# Patient Record
Sex: MALE | Race: WHITE | Employment: FULL TIME
[De-identification: names, ages, dates, MRNs, and addresses within clinical notes are randomized per-mention and may not be internally consistent; named-entity substitution may affect disease eponyms.]

---

## 2019-01-22 ENCOUNTER — NURSE TRIAGE (OUTPATIENT)
Dept: OTHER | Facility: CLINIC | Age: 29
End: 2019-01-22

## 2019-01-22 ENCOUNTER — HOSPITAL ENCOUNTER (EMERGENCY)
Dept: GENERAL RADIOLOGY | Age: 29
Discharge: HOME | End: 2019-01-22
Payer: COMMERCIAL

## 2019-01-22 ENCOUNTER — HOSPITAL ENCOUNTER (EMERGENCY)
Age: 29
Discharge: HOME | End: 2019-01-22
Payer: COMMERCIAL

## 2019-01-22 VITALS
DIASTOLIC BLOOD PRESSURE: 76 MMHG | HEART RATE: 76 BPM | TEMPERATURE: 98 F | HEIGHT: 70 IN | BODY MASS INDEX: 31.5 KG/M2 | OXYGEN SATURATION: 98 % | SYSTOLIC BLOOD PRESSURE: 123 MMHG | WEIGHT: 220 LBS | RESPIRATION RATE: 16 BRPM

## 2019-01-22 DIAGNOSIS — S93.401A SPRAIN OF RIGHT ANKLE, UNSPECIFIED LIGAMENT, INITIAL ENCOUNTER: Primary | ICD-10-CM

## 2019-01-22 PROCEDURE — 99202 OFFICE O/P NEW SF 15 MIN: CPT | Performed by: NURSE PRACTITIONER

## 2019-01-22 PROCEDURE — 99203 OFFICE O/P NEW LOW 30 MIN: CPT

## 2019-01-22 PROCEDURE — 73610 X-RAY EXAM OF ANKLE: CPT

## 2019-01-22 ASSESSMENT — PAIN DESCRIPTION - ORIENTATION: ORIENTATION: LEFT

## 2019-01-22 ASSESSMENT — ENCOUNTER SYMPTOMS
CHEST TIGHTNESS: 0
SHORTNESS OF BREATH: 0
COLOR CHANGE: 0

## 2019-01-22 ASSESSMENT — PAIN DESCRIPTION - LOCATION: LOCATION: ANKLE

## 2019-01-22 ASSESSMENT — PAIN DESCRIPTION - FREQUENCY: FREQUENCY: CONTINUOUS

## 2019-01-22 ASSESSMENT — PAIN - FUNCTIONAL ASSESSMENT: PAIN_FUNCTIONAL_ASSESSMENT: PREVENTS OR INTERFERES SOME ACTIVE ACTIVITIES AND ADLS

## 2019-01-22 ASSESSMENT — PAIN SCALES - GENERAL: PAINLEVEL_OUTOF10: 6

## 2019-01-22 ASSESSMENT — PAIN DESCRIPTION - PAIN TYPE: TYPE: ACUTE PAIN

## 2022-08-30 LAB
CHOLESTEROL, TOTAL: 177 MG/DL (ref 0–199)
FASTING: YES
GLUCOSE BLD-MCNC: 109 MG/DL (ref 74–109)
HDLC SERPL-MCNC: 38 MG/DL (ref 40–90)
LDL CHOLESTEROL CALCULATED: 119 MG/DL
TRIGL SERPL-MCNC: 102 MG/DL (ref 0–199)

## 2023-07-30 LAB — FASTING: YES

## 2023-07-31 LAB
CHOLEST SERPL-MCNC: 161 MG/DL (ref 0–199)
FASTING: YES
GLUCOSE SERPL-MCNC: 98 MG/DL (ref 74–109)
HDLC SERPL-MCNC: 41 MG/DL (ref 40–90)
LDLC SERPL CALC-MCNC: 105 MG/DL
TRIGL SERPL-MCNC: 76 MG/DL (ref 0–199)

## 2024-10-17 ENCOUNTER — OFFICE VISIT (OUTPATIENT)
Dept: ENT CLINIC | Age: 34
End: 2024-10-17
Payer: COMMERCIAL

## 2024-10-17 VITALS
DIASTOLIC BLOOD PRESSURE: 74 MMHG | OXYGEN SATURATION: 97 % | WEIGHT: 210.3 LBS | TEMPERATURE: 97.8 F | HEART RATE: 84 BPM | HEIGHT: 70 IN | BODY MASS INDEX: 30.11 KG/M2 | SYSTOLIC BLOOD PRESSURE: 114 MMHG

## 2024-10-17 DIAGNOSIS — J34.3 NASAL TURBINATE HYPERTROPHY: ICD-10-CM

## 2024-10-17 DIAGNOSIS — J32.9 RECURRENT SINUSITIS: ICD-10-CM

## 2024-10-17 DIAGNOSIS — R06.83 SNORING: ICD-10-CM

## 2024-10-17 DIAGNOSIS — J34.2 NASAL SEPTAL DEVIATION: Primary | ICD-10-CM

## 2024-10-17 PROCEDURE — 99204 OFFICE O/P NEW MOD 45 MIN: CPT | Performed by: REGISTERED NURSE

## 2024-10-17 RX ORDER — FLUTICASONE PROPIONATE 50 MCG
1 SPRAY, SUSPENSION (ML) NASAL DAILY
Qty: 16 G | Refills: 2 | Status: SHIPPED | OUTPATIENT
Start: 2024-10-17

## 2024-10-17 NOTE — PROGRESS NOTES
[facility] [place] [facility]  [facility] EAR, NOSE AND THROAT  [address]  [address]  [facility] [address]  Dept: [phone]  Dept Fax: [phone]  Loc: [phone]    Nader Heard is a 34 y.o. male who was referred by Unknown, Provider for:  Chief Complaint   Patient presents with    New Patient     Chronic pansinusitis, Nasal congestion, Referred by Facundo Phillip CNP*LM to confirm patient stated that he broke his nose in middle school and  never got it fixed or looked at. He stated that since breaking it he hasn't been able to breathe out of his nose. He also stated that his sinus infection use to be frequent but not anymore.     .    HPI:     Nader Heard presents today for sinonasal concerns.  Patient endorses history of nasal trauma in middle school and since then has been dealing with nasal obstructive symptoms.  He states that his congestion rotates from side-to-side but he is usually able to breathe better out of the left side than the right.  He does not have history of recurrent sinusitis but this is reportedly slowed down over the last several years.  Patient states that usually when he had the sinus concerns it impacted him with pressure and fullness surrounding bilateral cheeks and eyes.  He reportedly snores at night but only a couple times a week per spouse.  He denies any concern for restless sleep or sleep disordered breathing.  Patient endorses trying Afrin nasal spray in the past without significant improvement in symptoms.  No history of seasonal allergies endorsed.  No other ENT complaints or concerns at this time.    Subjective:      REVIEW OF SYSTEMS:    Pertinent positives as noted in the HPI. All other systems reviewed and negative.    ALLERGIES:  Patient has no known allergies.    Past Medical History:  History reviewed. No pertinent past medical history.    Family History:   History reviewed. No pertinent family history.    Surgical

## 2024-10-18 ENCOUNTER — HOSPITAL ENCOUNTER (OUTPATIENT)
Dept: CT IMAGING | Age: 34
Discharge: HOME | End: 2024-10-18
Attending: REGISTERED NURSE
Payer: COMMERCIAL

## 2024-10-18 DIAGNOSIS — J34.3 NASAL TURBINATE HYPERTROPHY: ICD-10-CM

## 2024-10-18 DIAGNOSIS — R06.83 SNORING: ICD-10-CM

## 2024-10-18 DIAGNOSIS — J34.2 NASAL SEPTAL DEVIATION: ICD-10-CM

## 2024-10-18 DIAGNOSIS — J32.9 RECURRENT SINUSITIS: ICD-10-CM

## 2024-10-18 PROCEDURE — 70486 CT MAXILLOFACIAL W/O DYE: CPT

## 2024-10-21 ENCOUNTER — TELEPHONE (OUTPATIENT)
Dept: ENT CLINIC | Age: 34
End: 2024-10-21

## 2024-10-21 DIAGNOSIS — R06.83 SNORING: ICD-10-CM

## 2024-10-21 DIAGNOSIS — J32.2 CHRONIC ETHMOIDAL SINUSITIS: ICD-10-CM

## 2024-10-21 DIAGNOSIS — J34.829 NASAL VALVE COLLAPSE: ICD-10-CM

## 2024-10-21 DIAGNOSIS — J34.89 CONCHA BULLOSA: ICD-10-CM

## 2024-10-21 DIAGNOSIS — J34.89 NASAL OBSTRUCTION: ICD-10-CM

## 2024-10-21 DIAGNOSIS — J34.3 HYPERTROPHY OF BOTH INFERIOR NASAL TURBINATES: ICD-10-CM

## 2024-10-21 DIAGNOSIS — J34.2 NASAL SEPTAL DEVIATION: Primary | ICD-10-CM

## 2024-10-21 NOTE — TELEPHONE ENCOUNTER
CT imaging reviewed in detail with Dr. Patiño and patient with surgical recommendations as follows:   Septoplasty   SMR of L inferior turbinate; possibly R   Madhavi bullosa resection of left middle turbinate (complex madhavi)   Bilateral Anterior ethmoidectomy    IGS   Nasal valve repair bilaterally    Patient wishes to proceed. I discussed surgical recommendations and risks vs. Benefits with him and he understands. No history of cardiac or pulmonary disorders. No personal or family history of bleeding disorders.    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

## 2024-11-07 ENCOUNTER — TELEPHONE (OUTPATIENT)
Dept: ENT CLINIC | Age: 34
End: 2024-11-07

## 2024-11-07 NOTE — TELEPHONE ENCOUNTER
Contacted patient to discuss surgery dates. Received his voicemail so left a message to call me.

## 2024-12-02 ENCOUNTER — PREP FOR PROCEDURE (OUTPATIENT)
Dept: ENT CLINIC | Age: 34
End: 2024-12-02

## 2024-12-02 DIAGNOSIS — J34.89 NASAL OBSTRUCTION: ICD-10-CM

## 2024-12-02 DIAGNOSIS — R06.83 SNORING: ICD-10-CM

## 2024-12-02 DIAGNOSIS — J34.2 NASAL SEPTAL DEVIATION: ICD-10-CM

## 2024-12-02 DIAGNOSIS — J34.3 HYPERTROPHY OF BOTH INFERIOR NASAL TURBINATES: ICD-10-CM

## 2024-12-02 DIAGNOSIS — J34.829 NASAL VALVE COLLAPSE: ICD-10-CM

## 2024-12-02 DIAGNOSIS — J34.89 CONCHA BULLOSA: ICD-10-CM

## 2024-12-02 DIAGNOSIS — J32.2 CHRONIC ETHMOIDAL SINUSITIS: ICD-10-CM

## 2024-12-18 ENCOUNTER — TELEPHONE (OUTPATIENT)
Dept: ENT CLINIC | Age: 34
End: 2024-12-18

## 2024-12-18 NOTE — TELEPHONE ENCOUNTER
PLEASE ADVISE ON THIS REQUEST FROM THE INSURANCE FOR [facility] SURGERY SCHEDULED ON 1/10/25.   THANK YOU!    IMAGE GUIDED BILATERAL ETHMOIDECTOMY,LEFT MIDDLE AYAN BULLOSA REMOVAL,SEPTOPLASTY, LEFT INFERIOR TURBINATE REDUCTION POSS RIGHT,NASAL VALVE COLLAPSE REPAIR

## 2024-12-20 NOTE — TELEPHONE ENCOUNTER
Staff; if we can coordinate for a quick visit for patient to come in on a day Dr. Patiño is here 12/23 or 12/30 for pictures to be obtained for insurance approval for upcoming procedure on 1/10.

## 2024-12-23 ENCOUNTER — PROCEDURE VISIT (OUTPATIENT)
Dept: ENT CLINIC | Age: 34
End: 2024-12-23

## 2024-12-23 DIAGNOSIS — J34.829 NASAL VALVE COLLAPSE: Primary | ICD-10-CM

## 2024-12-23 NOTE — PROGRESS NOTES
[facility] [place] [facility]  [facility] EAR, NOSE AND THROAT  [address]  [address]  [facility] [address]  Dept: [phone]  Dept Fax: [phone]  Loc: [phone]    Nader Heard is a 34 y.o. male who was referred by No ref. provider found for:  No chief complaint on file.      HPI:     Nader Heard is a 34 y.o. male   History of Present Illness  The patient presents for evaluation of nasal obstruction.    He reports a significant decrease in airflow through one nostril, necessitating a specific positioning of his nose to facilitate breathing.        History:     No Known Allergies  Current Outpatient Medications   Medication Sig Dispense Refill    fluticasone (FLONASE) 50 MCG/ACT nasal spray 1 spray by Each Nostril route daily 16 g 2     No current facility-administered medications for this visit.     No past medical history on file.   No past surgical history on file.  No family history on file.  Social History     Tobacco Use    Smoking status: Never    Smokeless tobacco: Never   Substance Use Topics    Alcohol use: No        Subjective:      Review of Systems  Rest of review of systems are negative, except as noted in HPI.     Objective:     There were no vitals taken for this visit.    Physical Exam   Physical Exam  Nasal examination was performed.  Photographs were taken: No airflow occurred through his right nasal airway and therefore the nasal valve could not be demonstrated but was positive to the extent that the left side collapse today on his prior visit.    Complete and durable collapse of the left nostril was seen.  No airflow was occurring on the right side but previously the same degree of collapse was seen with some nasal airflow through the right.              Vitals reviewed.  Results         No results found.   No results found for: \"NA\", \"K\", \"CL\", \"CO2\", \"BUN\", \"CREATININE\", \"GLU\", \"CALCIUM\", \"LABALBU\", \"BILITOT\", \"ALKPHOS\", \"AST\", \"ALT\"    All of the

## 2025-01-02 NOTE — PROGRESS NOTES
Follow all instructions given by your physician  If Urology case Call [phone] the weekday before procedure to find out Arrival Time.  Do not eat or drink anything after midnight prior to surgery(includes water, chewing gum, mints and ice chips)  Sips of water am of surgery with allowed medications  May brush teeth   Do not smoke or chew tobacco, drink alcoholic beverages or use any illicit drugs for 24 hours prior to surgery  Bring insurance info and photo ID  Bring pertinent paperwork with you from Doctor or surgeons's office  Wear clean comfortable, loose-fitting clothing  No make-up, nail polish, jewelry, piercings, or contact lenses to be worn day of surgery  No glue on dentures morning of surgery; you will be asked to remove them for surgery. Case for glasses.  Shower the night before and the morning of surgery with cleansing soap provided or a liquid antibacterial soap, dry with new fresh clean towel after each shower, no lotions, creams or powder.  Clean sheets and pillowcase on bed night before surgery  Bring medications in original bottles, Bring rescue inhalers with you  Bring CPAP/BIPAP machine if you have one ( you may be charged if one is needed in recovery room )    Do you have a DNR?   Please Bring Healthcare Directive or Healthcare Power of  in so we can scan it into your chart.    Our pharmacy has a Meds to Beds program where they will deliver any new prescriptions you may have to your room before you leave. Our Pharmacy will clear it through your insurance; for example (same co pay). This enables you to take your new RX as soon as you need when you get home and avoids stop/wait delays on the way home.  Please have a form of payment with you and have someone designated as your Pharmacy contact with their phone # as you may not feel well or still be under the influence of anesthesia.    Please refer to the SSI-Surgical Site Infection Flyer you hopefully received in the mail-together we

## 2025-01-06 ENCOUNTER — PREP FOR PROCEDURE (OUTPATIENT)
Dept: ENT CLINIC | Age: 35
End: 2025-01-06

## 2025-01-08 RX ORDER — SODIUM CHLORIDE 9 MG/ML
INJECTION, SOLUTION INTRAVENOUS PRN
Status: CANCELLED | OUTPATIENT
Start: 2025-01-08

## 2025-01-08 RX ORDER — SODIUM CHLORIDE 0.9 % (FLUSH) 0.9 %
5-40 SYRINGE (ML) INJECTION EVERY 12 HOURS SCHEDULED
Status: CANCELLED | OUTPATIENT
Start: 2025-01-08

## 2025-01-08 RX ORDER — SODIUM CHLORIDE 0.9 % (FLUSH) 0.9 %
5-40 SYRINGE (ML) INJECTION PRN
Status: CANCELLED | OUTPATIENT
Start: 2025-01-08

## 2025-01-08 NOTE — H&P
sinusitis but this is reportedly slowed down over the last several years.  Patient states that usually when he had the sinus concerns it impacted him with pressure and fullness surrounding bilateral cheeks and eyes.  He reportedly snores at night but only a couple times a week per spouse.  He denies any concern for restless sleep or sleep disordered breathing.  Patient endorses trying Afrin nasal spray in the past without significant improvement in symptoms.  No history of seasonal allergies endorsed.  No other ENT complaints or concerns at this time.     Subjective:      REVIEW OF SYSTEMS:    Pertinent positives as noted in the HPI. All other systems reviewed and negative.     ALLERGIES:  Patient has no known allergies.     Past Medical History:  Past Medical History   History reviewed. No pertinent past medical history.        Family History:   Family History   History reviewed. No pertinent family history.        Surgical History:  Past Surgical History   History reviewed. No pertinent surgical history.         MEDICATIONS:  Current Facility-Administered Medications          Current Outpatient Medications   Medication Sig Dispense Refill    fluticasone (FLONASE) 50 MCG/ACT nasal spray 1 spray by Each Nostril route daily 16 g 2    amoxicillin-clavulanate (AUGMENTIN) 875-125 MG per tablet Take 1 tablet by mouth 2 times daily for 10 days 20 tablet 0      No current facility-administered medications for this visit.            Objective:   /74 (Site: Right Upper Arm, Position: Sitting)   Pulse 84   Temp 97.8 °F (36.6 °C) (Temporal)   Ht 1.778 m (5' 10\")   Wt 95.4 kg (210 lb 4.8 oz)   SpO2 97%   BMI 30.17 kg/m²      PHYSICAL EXAM  Constitutional: Oriented and cooperative. Appears well-developed and well-nourished. No distress.   HENT:   Head: Normocephalic and atraumatic.   Right Ear:  External ear normal. Ear canal clear. Tympanic membrane translucent and intact. Middle ear aerated.   Left Ear:  External  prevent reinfection from retained secretions.  After 4 weeks of maximum medical therapy, will obtain CT sinus with IGS protocol and call to discuss results and further plan of care.  All questions were answered.         Electronically signed by IGLESIA Causey CNP on 10/17/2024 at 10:03 AM

## 2025-01-09 ENCOUNTER — TELEPHONE (OUTPATIENT)
Dept: ENT CLINIC | Age: 35
End: 2025-01-09

## 2025-01-09 NOTE — TELEPHONE ENCOUNTER
I contacted Nader to let him know that his insurance has still not approved his Nasal valve portion of his surgery. Offered to keep the remainder of his case scheduled for tomorrow but he had some questions.    (He came in to take extra photos and those were submitted but insurance has not approved yet and not sure they will by tomorrow).    He is asking how likely he would need this nasal valve repair done in the future if insurance won't approve? Is that portion of his surgery needed for him to breathe or will the rest of what is scheduled help?     Surgery: IMAGE GUIDED BILATERAL ETHMOIDECTOMY,LEFT MIDDLE AYAN BULLOSA REMOVAL,SEPTOPLASTY, LEFT INFERIOR TURBINATE REDUCTION POSS RIGHT, NASAL VALVE COLLAPSE REPAIR     Are you able to call the patient and explain.    [phone]    Please advise.    Thank you!

## 2025-01-09 NOTE — TELEPHONE ENCOUNTER
I contacted the patient and reviewed with him her options I recommend we proceed as planned with the exception of the nasal valve work.  I plan to \"bank\" a portion of his septal cartilage in a way that I can get access to it after his septoplasty and still use it for part of his nasal valve repair if it turns out he still needs it as much or more than initially expected.  He was consoled by that possibility and agrees he would like to proceed with all that has been approved.    Fredy Patiño MD

## 2025-01-10 ENCOUNTER — HOSPITAL ENCOUNTER (OUTPATIENT)
Age: 35
Setting detail: OUTPATIENT SURGERY
Discharge: HOME | End: 2025-01-10
Attending: OTOLARYNGOLOGY | Admitting: OTOLARYNGOLOGY
Payer: COMMERCIAL

## 2025-01-10 ENCOUNTER — ANESTHESIA EVENT (OUTPATIENT)
Dept: OPERATING ROOM | Age: 35
End: 2025-01-10
Payer: COMMERCIAL

## 2025-01-10 ENCOUNTER — ANESTHESIA (OUTPATIENT)
Dept: OPERATING ROOM | Age: 35
End: 2025-01-10
Payer: COMMERCIAL

## 2025-01-10 VITALS
SYSTOLIC BLOOD PRESSURE: 129 MMHG | RESPIRATION RATE: 18 BRPM | DIASTOLIC BLOOD PRESSURE: 78 MMHG | BODY MASS INDEX: 29.2 KG/M2 | HEIGHT: 70 IN | TEMPERATURE: 97.2 F | HEART RATE: 80 BPM | OXYGEN SATURATION: 96 % | WEIGHT: 204 LBS

## 2025-01-10 DIAGNOSIS — J34.829 NASAL VALVE COLLAPSE: ICD-10-CM

## 2025-01-10 DIAGNOSIS — J32.2 CHRONIC ETHMOIDAL SINUSITIS: ICD-10-CM

## 2025-01-10 DIAGNOSIS — R06.83 SNORING: ICD-10-CM

## 2025-01-10 DIAGNOSIS — J34.89 CONCHA BULLOSA: ICD-10-CM

## 2025-01-10 DIAGNOSIS — J34.2 NASAL SEPTAL DEVIATION: ICD-10-CM

## 2025-01-10 DIAGNOSIS — J34.3 HYPERTROPHY OF BOTH INFERIOR NASAL TURBINATES: ICD-10-CM

## 2025-01-10 DIAGNOSIS — J34.89 NASAL OBSTRUCTION: ICD-10-CM

## 2025-01-10 DIAGNOSIS — G89.18 ACUTE POST-OPERATIVE PAIN: Primary | ICD-10-CM

## 2025-01-10 PROCEDURE — 2709999900 HC NON-CHARGEABLE SUPPLY: Performed by: OTOLARYNGOLOGY

## 2025-01-10 PROCEDURE — 88305 TISSUE EXAM BY PATHOLOGIST: CPT

## 2025-01-10 PROCEDURE — 2580000003 HC RX 258: Performed by: NURSE ANESTHETIST, CERTIFIED REGISTERED

## 2025-01-10 PROCEDURE — 6360000002 HC RX W HCPCS: Performed by: NURSE PRACTITIONER

## 2025-01-10 PROCEDURE — 6360000002 HC RX W HCPCS: Performed by: NURSE ANESTHETIST, CERTIFIED REGISTERED

## 2025-01-10 PROCEDURE — 3700000001 HC ADD 15 MINUTES (ANESTHESIA): Performed by: OTOLARYNGOLOGY

## 2025-01-10 PROCEDURE — 7100000001 HC PACU RECOVERY - ADDTL 15 MIN: Performed by: OTOLARYNGOLOGY

## 2025-01-10 PROCEDURE — 6370000000 HC RX 637 (ALT 250 FOR IP): Performed by: OTOLARYNGOLOGY

## 2025-01-10 PROCEDURE — 88300 SURGICAL PATH GROSS: CPT

## 2025-01-10 PROCEDURE — 2500000003 HC RX 250 WO HCPCS: Performed by: NURSE ANESTHETIST, CERTIFIED REGISTERED

## 2025-01-10 PROCEDURE — 7100000000 HC PACU RECOVERY - FIRST 15 MIN: Performed by: OTOLARYNGOLOGY

## 2025-01-10 PROCEDURE — 6370000000 HC RX 637 (ALT 250 FOR IP): Performed by: NURSE ANESTHETIST, CERTIFIED REGISTERED

## 2025-01-10 PROCEDURE — 3700000000 HC ANESTHESIA ATTENDED CARE: Performed by: OTOLARYNGOLOGY

## 2025-01-10 PROCEDURE — 3600000014 HC SURGERY LEVEL 4 ADDTL 15MIN: Performed by: OTOLARYNGOLOGY

## 2025-01-10 PROCEDURE — 7100000010 HC PHASE II RECOVERY - FIRST 15 MIN: Performed by: OTOLARYNGOLOGY

## 2025-01-10 PROCEDURE — 2580000003 HC RX 258: Performed by: OTOLARYNGOLOGY

## 2025-01-10 PROCEDURE — 3600000004 HC SURGERY LEVEL 4 BASE: Performed by: OTOLARYNGOLOGY

## 2025-01-10 PROCEDURE — 2580000003 HC RX 258: Performed by: NURSE PRACTITIONER

## 2025-01-10 PROCEDURE — 6360000002 HC RX W HCPCS: Performed by: OTOLARYNGOLOGY

## 2025-01-10 PROCEDURE — APPNB45 APP NON BILLABLE 31-45 MINUTES: Performed by: NURSE PRACTITIONER

## 2025-01-10 PROCEDURE — 7100000011 HC PHASE II RECOVERY - ADDTL 15 MIN: Performed by: OTOLARYNGOLOGY

## 2025-01-10 PROCEDURE — 2720000010 HC SURG SUPPLY STERILE: Performed by: OTOLARYNGOLOGY

## 2025-01-10 RX ORDER — HYDROCODONE BITARTRATE AND ACETAMINOPHEN 5; 325 MG/1; MG/1
1 TABLET ORAL EVERY 6 HOURS PRN
Qty: 28 TABLET | Refills: 0 | Status: SHIPPED | OUTPATIENT
Start: 2025-01-10 | End: 2025-01-17

## 2025-01-10 RX ORDER — SODIUM CHLORIDE 0.9 % (FLUSH) 0.9 %
5-40 SYRINGE (ML) INJECTION PRN
Status: DISCONTINUED | OUTPATIENT
Start: 2025-01-10 | End: 2025-01-10 | Stop reason: HOSPADM

## 2025-01-10 RX ORDER — DEXAMETHASONE SODIUM PHOSPHATE 10 MG/ML
10 INJECTION, EMULSION INTRAMUSCULAR; INTRAVENOUS ONCE
Status: COMPLETED | OUTPATIENT
Start: 2025-01-10 | End: 2025-01-10

## 2025-01-10 RX ORDER — HYDROMORPHONE HYDROCHLORIDE 2 MG/ML
INJECTION, SOLUTION INTRAMUSCULAR; INTRAVENOUS; SUBCUTANEOUS
Status: DISCONTINUED | OUTPATIENT
Start: 2025-01-10 | End: 2025-01-10 | Stop reason: SDUPTHER

## 2025-01-10 RX ORDER — LIDOCAINE HCL/PF 100 MG/5ML
SYRINGE (ML) INJECTION
Status: DISCONTINUED | OUTPATIENT
Start: 2025-01-10 | End: 2025-01-10 | Stop reason: SDUPTHER

## 2025-01-10 RX ORDER — MIDAZOLAM HYDROCHLORIDE 1 MG/ML
INJECTION, SOLUTION INTRAMUSCULAR; INTRAVENOUS
Status: DISCONTINUED | OUTPATIENT
Start: 2025-01-10 | End: 2025-01-10 | Stop reason: SDUPTHER

## 2025-01-10 RX ORDER — OXYMETAZOLINE HYDROCHLORIDE 0.05 G/100ML
SPRAY NASAL PRN
Status: DISCONTINUED | OUTPATIENT
Start: 2025-01-10 | End: 2025-01-10 | Stop reason: ALTCHOICE

## 2025-01-10 RX ORDER — ROCURONIUM BROMIDE 10 MG/ML
INJECTION, SOLUTION INTRAVENOUS
Status: DISCONTINUED | OUTPATIENT
Start: 2025-01-10 | End: 2025-01-10 | Stop reason: SDUPTHER

## 2025-01-10 RX ORDER — SODIUM CHLORIDE 0.9 % (FLUSH) 0.9 %
5-40 SYRINGE (ML) INJECTION EVERY 12 HOURS SCHEDULED
Status: DISCONTINUED | OUTPATIENT
Start: 2025-01-10 | End: 2025-01-10 | Stop reason: HOSPADM

## 2025-01-10 RX ORDER — FENTANYL CITRATE 50 UG/ML
INJECTION, SOLUTION INTRAMUSCULAR; INTRAVENOUS
Status: DISCONTINUED | OUTPATIENT
Start: 2025-01-10 | End: 2025-01-10 | Stop reason: SDUPTHER

## 2025-01-10 RX ORDER — SODIUM CHLORIDE 9 MG/ML
INJECTION, SOLUTION INTRAVENOUS
Status: DISCONTINUED | OUTPATIENT
Start: 2025-01-10 | End: 2025-01-10 | Stop reason: SDUPTHER

## 2025-01-10 RX ORDER — TRANEXAMIC ACID 100 MG/ML
INJECTION, SOLUTION INTRAVENOUS
Status: DISCONTINUED | OUTPATIENT
Start: 2025-01-10 | End: 2025-01-10 | Stop reason: SDUPTHER

## 2025-01-10 RX ORDER — ONDANSETRON 2 MG/ML
INJECTION INTRAMUSCULAR; INTRAVENOUS
Status: DISCONTINUED | OUTPATIENT
Start: 2025-01-10 | End: 2025-01-10 | Stop reason: SDUPTHER

## 2025-01-10 RX ORDER — SODIUM CHLORIDE 9 MG/ML
INJECTION, SOLUTION INTRAVENOUS PRN
Status: DISCONTINUED | OUTPATIENT
Start: 2025-01-10 | End: 2025-01-10 | Stop reason: HOSPADM

## 2025-01-10 RX ORDER — PROPOFOL 10 MG/ML
INJECTION, EMULSION INTRAVENOUS
Status: DISCONTINUED | OUTPATIENT
Start: 2025-01-10 | End: 2025-01-10 | Stop reason: SDUPTHER

## 2025-01-10 RX ORDER — MINERAL OIL, WHITE PETROLATUM .03; .94 G/G; G/G
OINTMENT OPHTHALMIC
Status: DISCONTINUED | OUTPATIENT
Start: 2025-01-10 | End: 2025-01-10 | Stop reason: SDUPTHER

## 2025-01-10 RX ORDER — HYDROCODONE BITARTRATE AND ACETAMINOPHEN 5; 325 MG/1; MG/1
1 TABLET ORAL ONCE
Status: COMPLETED | OUTPATIENT
Start: 2025-01-10 | End: 2025-01-10

## 2025-01-10 RX ORDER — CIPROFLOXACIN 500 MG/1
500 TABLET, FILM COATED ORAL 2 TIMES DAILY
Qty: 20 TABLET | Refills: 0 | Status: SHIPPED | OUTPATIENT
Start: 2025-01-10 | End: 2025-01-20

## 2025-01-10 RX ORDER — MUPIROCIN 20 MG/G
OINTMENT TOPICAL
Qty: 15 G | Refills: 1 | Status: SHIPPED | OUTPATIENT
Start: 2025-01-10

## 2025-01-10 RX ADMIN — SODIUM CHLORIDE: 9 INJECTION, SOLUTION INTRAVENOUS at 15:48

## 2025-01-10 RX ADMIN — TRANEXAMIC ACID 1000 MG: 100 INJECTION, SOLUTION INTRAVENOUS at 17:21

## 2025-01-10 RX ADMIN — Medication 80 MG: at 14:34

## 2025-01-10 RX ADMIN — FENTANYL CITRATE 50 MCG: 50 INJECTION, SOLUTION INTRAMUSCULAR; INTRAVENOUS at 15:10

## 2025-01-10 RX ADMIN — MIDAZOLAM 2 MG: 1 INJECTION INTRAMUSCULAR; INTRAVENOUS at 14:30

## 2025-01-10 RX ADMIN — PROPOFOL 170 MG: 10 INJECTION, EMULSION INTRAVENOUS at 14:34

## 2025-01-10 RX ADMIN — DEXAMETHASONE SODIUM PHOSPHATE 10 MG: 10 INJECTION, EMULSION INTRAMUSCULAR; INTRAVENOUS at 12:50

## 2025-01-10 RX ADMIN — SODIUM CHLORIDE: 9 INJECTION, SOLUTION INTRAVENOUS at 14:30

## 2025-01-10 RX ADMIN — PIPERACILLIN SODIUM AND TAZOBACTAM SODIUM 3375 MG: 3; .375 INJECTION, POWDER, LYOPHILIZED, FOR SOLUTION INTRAVENOUS at 15:48

## 2025-01-10 RX ADMIN — PIPERACILLIN SODIUM AND TAZOBACTAM SODIUM 3375 MG: 3; .375 INJECTION, POWDER, LYOPHILIZED, FOR SOLUTION INTRAVENOUS at 14:40

## 2025-01-10 RX ADMIN — ONDANSETRON 4 MG: 2 INJECTION INTRAMUSCULAR; INTRAVENOUS at 17:25

## 2025-01-10 RX ADMIN — HYDROCODONE BITARTRATE AND ACETAMINOPHEN 1 TABLET: 5; 325 TABLET ORAL at 19:00

## 2025-01-10 RX ADMIN — MINERAL OIL, WHITE PETROLATUM 1 ML: .03; .94 OINTMENT OPHTHALMIC at 14:40

## 2025-01-10 RX ADMIN — HYDROMORPHONE HYDROCHLORIDE 0.3 MG: 2 INJECTION INTRAMUSCULAR; INTRAVENOUS; SUBCUTANEOUS at 17:17

## 2025-01-10 RX ADMIN — FENTANYL CITRATE 50 MCG: 50 INJECTION, SOLUTION INTRAMUSCULAR; INTRAVENOUS at 15:01

## 2025-01-10 RX ADMIN — ROCURONIUM BROMIDE 50 MG: 10 INJECTION INTRAVENOUS at 14:34

## 2025-01-10 RX ADMIN — FENTANYL CITRATE 100 MCG: 50 INJECTION, SOLUTION INTRAMUSCULAR; INTRAVENOUS at 14:34

## 2025-01-10 RX ADMIN — SUGAMMADEX 200 MG: 100 INJECTION, SOLUTION INTRAVENOUS at 17:25

## 2025-01-10 ASSESSMENT — PAIN - FUNCTIONAL ASSESSMENT: PAIN_FUNCTIONAL_ASSESSMENT: NONE - DENIES PAIN

## 2025-01-10 NOTE — DISCHARGE INSTRUCTIONS
Instructions specifically for  from Dr. Fredy Patiño:  1.  Rest; sleep propped up or in an easy chair/recliner for the first 2-3 nights  2.  Apply antibiotic ointment to the nostrils with a Q-tip three times daily  3.  Do not rub or blow your nose until after the splints have been removed  4.  Change your nasal drip pad twice a day and as needed for saturation; you can stop placing a drip pad once your nose no longer has bloody discharge  5.  For the first 2 to 3 days you may need to change the drip pad several times per day and at short intervals even once an hour  6.  If your bleeding becomes profuse, lean forward and allowed to flow out of your nose and come to the emergency department to be evaluated.  Have me or one of my colleagues contacted to examine you.  It is possible you will need a nasal cautery procedure.  Fortunately, this is very unlikely.  7.  Use saline spray or aerosol saline spray ([employer] or [employer] brand) to help clear the splints of blood/mucous/crusting (this will help with nasal congestion)  8.  Use the Afrin (oxymetazoline) nasal spray for increased bleeding    For emergencies:  Fredy Patiño MD  Cell: [phone]

## 2025-01-10 NOTE — OP NOTE
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]     * Chronic ethmoidal sinusitis [J32.2]     * Hypertrophy of both inferior nasal turbinates [J34.3]     * Nasal valve collapse [J34.829]     * Snoring [R06.83]     * Nasal obstruction [J34.89]    Post-Op Diagnosis: Same       Procedure(s):  IMAGE GUIDED BILATERAL ETHMOIDECTOMY,LEFT MIDDLE AYAN BULLOSA REMOVAL,SEPTOPLASTY, LEFT INFERIOR TURBINATE REDUCTION  POSS RIGHT    Surgeon(s):  Fredy Patiño MD    Assistant:   * No surgical staff found *    Anesthesia: General    Estimated Blood Loss (mL): 200     Complications: None    Specimens:   ID Type Source Tests Collected by Time Destination   A : LEFT AYAN BULLOSA Tissue Nose SURGICAL PATHOLOGY rFedy Patiño MD 1/10/2025 1532    B : SEPTAL CONTENTS Tissue Nose SURGICAL PATHOLOGY Fredy Patiño MD 1/10/2025 1547    C : RIGHT INFERIOR TURBINATES Tissue Nose SURGICAL PATHOLOGY Fredy Patiño MD 1/10/2025 1616    D : LEFT INFERIOR TURBINATES Tissue Nose SURGICAL PATHOLOGY Fredy Patiño MD 1/10/2025 1616    E : RIGHT UNCINATE AND BULLA ETHMOIDALIS Tissue Nose SURGICAL PATHOLOGY Fredy Patiño MD 1/10/2025 1646        Implants:  * No implants in log *      Drains: * No LDAs found *    Findings:  Infection Present At Time Of Surgery (PATOS) (choose all levels that have infection present):  - Superficial Infection (skin/subcutaneous) present as evidenced by purulent fluid  Other Findings: 1.  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  The anterior ethmoid air cells had turbid fluid within the in the mucous membranes were clearly thickened throughout that sinus system which was opened bilaterally  3.  It was impossible to get to the middle  of room I went back to telescope use with the navigation system.  I used a freer elevator to medialized the right middle turbinate and with a 30 degree optical telescope and calibrated instruments I entered the middle meatus of the right side.  I incised the very thick uncinate process of the maxilla on the right side using a [place] elevator so as to make a broader space between the middle turbinate and the lateral wall so that his middle meatus could be cleaned out in the office.  I used grasping Blakesley forceps to remove this tissue handing off the field to be placed in formalin for permanent section.  I also opened the bulla ethmoidalis at this time and sent representative specimens of that side as well.  Using the 12 degree debrider I opened the anterior ethmoid air cells beyond the bulla and then gradually made my way posteriorly medially and inferiorly using the navigation system to stay just below the skull base and moved towards the posterior ethmoid system.  Once in the ethmoid system posteriorly through the ground lamella, I entered the cells and continued towards the sphenoid sinus wall.  I stopped before entering the sphenoid sinus and used the debrider to open up additional cells medially and laterally to widen the sinusotomy space.  Suction cautery was necessary for hemostasis along with topical Afrin.     After irrigating out the nasal septal cavity and the sinusotomy vaults on both sides as well achieving hemostasis, I placed a postage stamp size section of straight cartilage into this space and using a nasal septal stapler, I stapled the left and right sides of the septal mucosa together in order to hold the grafted cartilage in place for the possibility of using it to strengthen his nostrils in the future.  I used for the 8 staples in the stapling device leaving for plus another stapler for Crystal to use in the closure.      Using a nasal septal stapler, she stapled the left and right sides of

## 2025-01-10 NOTE — PROGRESS NOTES
Patient admitted to [facility] room 9 with wife at bedside. Bed in low position side rails up call light in reach. Patient denies questions at this time.       Ledy (wife) [phone]

## 2025-01-10 NOTE — ANESTHESIA PRE PROCEDURE
II          Dental:          Pulmonary: breath sounds clear to auscultation                             Cardiovascular:  Exercise tolerance: good (>4 METS)          Rhythm: regular  Rate: normal                    Neuro/Psych:               GI/Hepatic/Renal:             Endo/Other:                     Abdominal:             Vascular:          Other Findings:       Anesthesia Plan      general     ASA 1       Induction: intravenous.    MIPS: Postoperative opioids intended and Prophylactic antiemetics administered.  Anesthetic plan and risks discussed with patient and spouse.      Plan discussed with CRNA.                LEONARD ELLSWORTH DO   1/10/2025

## 2025-01-10 NOTE — PROGRESS NOTES
1758: Pt arrives to pacu. Awakens to voice. Pt denies any pain and closes eyes easily. Respirations easy and unlabored. Pt on 3L NC. No drainage from nares noted. Drip pad placed. VSS.     1805: Dr. Patiño at pt bedside. No new orders received. Pt resting in bed with eyes closed. Awakens easily to voice and closes eyes easily. Denies any pain. VSS.     1815: Pt resting comfortably in bed with eyes closed. Respirations easy and unlabored. Pt on 2L NC.     1825: Pt resting in bed awake. Pt reports surgical pain in nose 4-5/10. Crushed ice pack applied to forehead. Pt easily closes eyes and resting comfortably. VSS.     1828: Pt meets criteria for pacu discharge.     1833: Pt transported to [facility] in stable condition.

## 2025-01-11 ENCOUNTER — TELEPHONE (OUTPATIENT)
Dept: ENT CLINIC | Age: 35
End: 2025-01-11

## 2025-01-11 NOTE — ANESTHESIA POSTPROCEDURE EVALUATION
Department of Anesthesiology  Postprocedure Note    Patient: Nader Heard  MRN: [id number]  YOB: 1990  Date of evaluation: 1/10/2025    Procedure Summary       Date: 01/10/25 Room / Location: [facility] OR  / [facility] OR    Anesthesia Start: 1430 Anesthesia Stop: 1810    Procedure: IMAGE GUIDED BILATERAL ETHMOIDECTOMY,LEFT MIDDLE AYAN BULLOSA REMOVAL,SEPTOPLASTY, BILATERAL INFERIOR TURBINATE REDUCTION (Nose) Diagnosis:       Nasal septal deviation      Ayan bullosa      Chronic ethmoidal sinusitis      Hypertrophy of both inferior nasal turbinates      Nasal valve collapse      Snoring      Nasal obstruction      (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    Redd Phase I: Redd Score: 9    Redd Phase II: Redd Score: 10    Anesthesia Post Evaluation    Patient location during evaluation: PACU  Patient participation: complete - patient participated  Level of consciousness: awake and alert  Airway patency: patent  Nausea & Vomiting: no nausea  Cardiovascular status: blood pressure returned to baseline and hemodynamically stable  Respiratory status: acceptable and spontaneous ventilation  Hydration status: euvolemic  Pain management: adequate    No notable events documented.

## 2025-01-11 NOTE — PROGRESS NOTES
Pt has met discharge criteria and states he is ready for discharge to home. IV removed, gauze and tape applied. Dressed in own clothes and personal belongings gathered. Discharge instructions (with opioid medication education information) given to pt and family; pt and family verbalized understanding of discharge instructions, prescriptions and follow up appointments. Pt transported to discharge lobby by [facility] staff.

## 2025-01-11 NOTE — TELEPHONE ENCOUNTER
Contacted the patient to see that he was doing well following discharge after extensive endonasal surgery.  He stated that he was that overall he is having no unexpected issues.  He is exceeded watching part of the [employer] game which was something he was very anxious to achieve.  He had no problems that needed addressing.  I will see him in follow-up in a little over a week.  Fredy Patiño MD

## 2025-01-20 ENCOUNTER — OFFICE VISIT (OUTPATIENT)
Dept: ENT CLINIC | Age: 35
End: 2025-01-20
Payer: COMMERCIAL

## 2025-01-20 VITALS
SYSTOLIC BLOOD PRESSURE: 120 MMHG | OXYGEN SATURATION: 95 % | BODY MASS INDEX: 29.07 KG/M2 | HEIGHT: 70 IN | TEMPERATURE: 97.6 F | DIASTOLIC BLOOD PRESSURE: 76 MMHG | WEIGHT: 203.1 LBS | HEART RATE: 78 BPM | RESPIRATION RATE: 18 BRPM

## 2025-01-20 DIAGNOSIS — Z98.890 STATUS POST FUNCTIONAL ENDOSCOPIC SINUS SURGERY (FESS): ICD-10-CM

## 2025-01-20 DIAGNOSIS — J34.89 NASAL OBSTRUCTION: Primary | ICD-10-CM

## 2025-01-20 DIAGNOSIS — Z98.890 STATUS POST NASAL SEPTOPLASTY: ICD-10-CM

## 2025-01-20 PROCEDURE — 99024 POSTOP FOLLOW-UP VISIT: CPT | Performed by: OTOLARYNGOLOGY

## 2025-01-20 PROCEDURE — 31237 NSL/SINS NDSC SURG BX POLYPC: CPT | Performed by: OTOLARYNGOLOGY

## 2025-01-20 RX ORDER — SOD CHLOR,BICARB/SQUEEZ BOTTLE
1 PACKET, WITH RINSE DEVICE NASAL DAILY
Qty: 1 KIT | Refills: 0 | Status: SHIPPED | OUTPATIENT
Start: 2025-01-20 | End: 2025-01-22 | Stop reason: CLARIF

## 2025-01-21 NOTE — PROGRESS NOTES
[facility] [place] [facility]  [facility] EAR, NOSE AND THROAT  [address]  [place] [id number]  Dept: [phone]  Dept Fax: [phone]  Loc: [phone]    Nader Heard is a 34 y.o. male who was referred by No ref. provider found for:  Chief Complaint   Patient presents with    Post-Op Check     Patient here for post op exam.patient stated that he is doing good since surgery.       HPI:       History of Present Illness  Nader Heard is a 34 y.o. male who is here for his first postoperative visit after a septoplasty and paranasal sinus surgery.    He reports experiencing soreness during the night following the surgery, specifically around 3 to 4 AM. However, he has not experienced any significant pain since then, only discomfort. He also mentions minor snoring, which is not a nightly occurrence and appears to be influenced by his sleeping position. He does not have a nasal irrigation bottle at home so he has been using a nasal spray but it keeps the channels in the septal splints open so he could breathe.        History:     No Known Allergies  Current Outpatient Medications   Medication Sig Dispense Refill    ciprofloxacin (CIPRO) 500 MG tablet Take 1 tablet by mouth 2 times daily for 10 days 20 tablet 0    mupirocin (BACTROBAN) 2 % ointment Apply to inside each nostril with Qtip 3 times daily. 15 g 1     No current facility-administered medications for this visit.     History reviewed. No pertinent past medical history.   Past Surgical History:   Procedure Laterality Date    FRACTURE SURGERY Left 2005    arm    SINUS ENDOSCOPY N/A 1/10/2025    IMAGE GUIDED BILATERAL ETHMOIDECTOMY,LEFT MIDDLE AYAN BULLOSA REMOVAL,SEPTOPLASTY, BILATERAL INFERIOR TURBINATE REDUCTION performed by Fredy Patiño MD at [facility] OR     Family History   Problem Relation Age of Onset    Other Mother         heart failure and transplant     Social History     Tobacco Use    Smoking

## 2025-01-22 RX ORDER — SOD CHLOR,BICARB/SQUEEZ BOTTLE
1 PACKET, WITH RINSE DEVICE NASAL DAILY
Qty: 1 KIT | Refills: 0 | Status: SHIPPED | COMMUNITY
Start: 2025-01-22

## 2025-02-04 NOTE — PROGRESS NOTES
[facility] [place] [facility]  [facility] EAR, NOSE AND THROAT  [address]  [address]  [place] [id number]  Dept: [phone]  Dept Fax: [phone]  Loc: [phone]    Nader Heard is a 34 y.o. male who was referred by No ref. provider found for:  Chief Complaint   Patient presents with    Post-Op Check     Patient is here today post op IGS and ethmoid, ayan, inf turbs, septo, nasal valve. Patient states he has been doing great. Patient has no concerns at this time.   .    HPI:     Nader Heard presents today for post operative appointment. Patient is s/p septoplasty, bilateral inferior turbinate reduction, partial reduction of left middle ayan bullosa, IGS bilateral ethmoidectomy on 1/10/25 with Dr. Patiño. Patient states he has been doing well since surgery. Some very mild nasal congestion more so to the left than the right. Continuing with nasal saline rinses and feels he is not getting much out as healing continues. No sinus pressure/pain or noted purulent nasal drainage. Very happy with progression after surgery. No other concerns endorsed.     Subjective:      REVIEW OF SYSTEMS:    Pertinent positives as noted in the HPI. All other systems reviewed and negative.    ALLERGIES:  Patient has no known allergies.    Past Medical History:  History reviewed. No pertinent past medical history.    Family History:       Problem Relation Age of Onset    Other Mother         heart failure and transplant       Surgical History:  Past Surgical History:   Procedure Laterality Date    FRACTURE SURGERY Left 2005    arm    SINUS ENDOSCOPY N/A 1/10/2025    IMAGE GUIDED BILATERAL ETHMOIDECTOMY,LEFT MIDDLE AYAN BULLOSA REMOVAL,SEPTOPLASTY, BILATERAL INFERIOR TURBINATE REDUCTION performed by Fredy Patiño MD at [facility] OR        MEDICATIONS:  Current Outpatient Medications   Medication Sig Dispense Refill    Hypertonic Nasal Wash (SINUS RINSE) PACK 1 kit by Nasal route daily 1

## 2025-02-05 ENCOUNTER — OFFICE VISIT (OUTPATIENT)
Dept: ENT CLINIC | Age: 35
End: 2025-02-05

## 2025-02-05 VITALS
OXYGEN SATURATION: 96 % | TEMPERATURE: 97.6 F | HEART RATE: 78 BPM | BODY MASS INDEX: 29.28 KG/M2 | HEIGHT: 70 IN | WEIGHT: 204.5 LBS | DIASTOLIC BLOOD PRESSURE: 78 MMHG | RESPIRATION RATE: 18 BRPM | SYSTOLIC BLOOD PRESSURE: 102 MMHG

## 2025-02-05 DIAGNOSIS — Z98.890 S/P NASAL SURGERY: Primary | ICD-10-CM

## 2025-02-05 PROCEDURE — 99024 POSTOP FOLLOW-UP VISIT: CPT | Performed by: REGISTERED NURSE

## 2025-06-22 LAB — FASTING: YES

## 2025-06-23 LAB
CHOLEST SERPL-MCNC: 168 MG/DL (ref 0–199)
FASTING: YES
GLUCOSE SERPL-MCNC: 123 MG/DL (ref 74–109)
HDLC SERPL-MCNC: 43 MG/DL (ref 40–90)
LDLC SERPL CALC-MCNC: 112 MG/DL
TRIGL SERPL-MCNC: 64 MG/DL (ref 0–199)

## (undated) DEVICE — COAGULATOR SUCT 8FR L6IN HNDL FOR ENT PROC

## (undated) DEVICE — SYRINGE IRRIG 60ML SFT PLIABLE BLB EZ TO GRP 1 HND USE W/

## (undated) DEVICE — PACK-MAJOR

## (undated) DEVICE — APPLICATOR MEDICATED 10.5 CC SOLUTION CLR STRL CHLORAPREP

## (undated) DEVICE — BLADE 1884012EM RAD12 4MM M4 ROTATE ROHS: Brand: FUSION®

## (undated) DEVICE — INSTRUMENT TRACKER 9733533XOM ENT 1PK

## (undated) DEVICE — GLOVE SURG SZ 7.5 L11.73IN FNGR THK9.8MIL STRW LTX POLYMER

## (undated) DEVICE — PATIENT TRACKER 9734887XOM NON-INVASIVE

## (undated) DEVICE — SPLINT 1524050 5PK PAIR DOYLE II AIRWAY: Brand: DOYLE II ™

## (undated) DEVICE — TUBING 1895522 5PK STRAIGHTSHOT TO XPS: Brand: STRAIGHTSHOT®

## (undated) DEVICE — Device

## (undated) DEVICE — ENTACT SEPTAL STAPLER 3 PACK: Brand: ENT SINUS

## (undated) DEVICE — ENT: Brand: MEDLINE INDUSTRIES, INC.